# Patient Record
Sex: MALE | Race: WHITE | NOT HISPANIC OR LATINO
[De-identification: names, ages, dates, MRNs, and addresses within clinical notes are randomized per-mention and may not be internally consistent; named-entity substitution may affect disease eponyms.]

---

## 2018-09-21 ENCOUNTER — APPOINTMENT (OUTPATIENT)
Dept: ORTHOPEDIC SURGERY | Facility: CLINIC | Age: 68
End: 2018-09-21
Payer: MEDICARE

## 2018-09-21 VITALS
HEART RATE: 55 BPM | SYSTOLIC BLOOD PRESSURE: 121 MMHG | HEIGHT: 69 IN | DIASTOLIC BLOOD PRESSURE: 76 MMHG | WEIGHT: 198 LBS | BODY MASS INDEX: 29.33 KG/M2

## 2018-09-21 PROCEDURE — 99203 OFFICE O/P NEW LOW 30 MIN: CPT

## 2019-04-24 ENCOUNTER — APPOINTMENT (OUTPATIENT)
Dept: ORTHOPEDIC SURGERY | Facility: CLINIC | Age: 69
End: 2019-04-24
Payer: MEDICARE

## 2019-04-24 DIAGNOSIS — M75.121 COMPLETE ROTATOR CUFF TEAR OR RUPTURE OF RIGHT SHOULDER, NOT SPECIFIED AS TRAUMATIC: ICD-10-CM

## 2019-04-24 PROCEDURE — 99213 OFFICE O/P EST LOW 20 MIN: CPT | Mod: 25

## 2019-04-24 PROCEDURE — 20610 DRAIN/INJ JOINT/BURSA W/O US: CPT | Mod: RT

## 2019-04-24 NOTE — PHYSICAL EXAM
[UE] : Sensory: Intact in bilateral upper extremities [Rad] : radial 2+ and symmetric bilaterally [B.R.] : biceps 2+ and symmetric bilaterally [de-identified] : Pt is pleasant 67 y/o male AAOx3 with no apparent distress, normal BMI.  Physical examination of both shoulders reveals normal contours with no deformity with no atrophy, skin intact, with no signs of infection, no erythema, no swelling, no discoloration, no distal lymphedema, no patholaxity, no muscle atrophy noted. ROM of right shoulder reveals forward flexion to 70 degrees, external rotation 60 degrees, IR/ADD to L2. Strength is 4/5 with FF and 5/5 with ER testing. + Speeds test and negative Slope. No neurological deficits noted. \par \par

## 2019-04-24 NOTE — DISCUSSION/SUMMARY
[de-identified] : 67 y/o male with right shoulder pain secondary to rotator cuff tear. A lengthy discussion was held regarding the patient’s condition and treatment options including all risks, benefits, prognosis and outcomes of each were discussed in detail. The patient was advised on having surgery but due to his job he does not want to pursue surgery. He would like to postpone a few years and he was advised that his cuff repair success rate will decrease the longer he waits. The patient understand the risk and he would like to continue with conservative treatment and have a cortisone injection. He was given an injection which he tolerated well. He was advised on the use of NSAIDS for pain control, icing and activity modification. The patient will contact me if there are any concerns otherwise follow up will be on a prn basis. The patient express understanding and all questions were answered. \par

## 2019-04-24 NOTE — HISTORY OF PRESENT ILLNESS
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 He denies any new injury but states that he was operating on a patient and twinge his shoulder which made his pain worse for the past month. It has not improved since the injury. He was treated with cortisone injection by Dr. Bernardo Payne in the past which did help and would like to try another cortisone injection today. He states the pain is 8/10 at this visit. He does not want to have surgery and would like to continue with conservative treatment due to his job. The pain does not wake him up at night.

## 2019-04-24 NOTE — CONSULT LETTER
[Dear  ___] : Dear  [unfilled], [FreeTextEntry1] : I had the pleasure of evaluating your patient in the office today for complaints of right shoulder pain secondary rotator cuff tear. I have enclosed a copy of today's office notes for your charts and for your review.\par \par Sincerely, \par \par Valentin Christianson MS PA-C\par Department of Orthopedic Surgery\par [facility] [place]

## 2019-04-24 NOTE — PROCEDURE
[de-identified] : After careful discussion regarding the risks versus benefits of a corticosteroid injection the patient has elected to proceed with that treatment. Under sterile conditions, the patient received  a right shoulder injection into the subacromial space with 7 cc of half percent Marcaine without epinephrine but with 2 cc of Celestone. The patient tolerated the injection without problem.

## 2022-04-08 ENCOUNTER — APPOINTMENT (OUTPATIENT)
Dept: ORTHOPEDIC SURGERY | Facility: CLINIC | Age: 72
End: 2022-04-08
Payer: MEDICARE

## 2022-04-08 VITALS — WEIGHT: 198 LBS | HEIGHT: 69 IN | BODY MASS INDEX: 29.33 KG/M2

## 2022-04-08 PROCEDURE — 99213 OFFICE O/P EST LOW 20 MIN: CPT | Mod: 25

## 2022-04-08 PROCEDURE — J3490M: CUSTOM

## 2022-04-08 PROCEDURE — 20550 NJX 1 TENDON SHEATH/LIGAMENT: CPT

## 2022-04-08 NOTE — PHYSICAL EXAM
[de-identified] : Right Hand: Inspection of the hand/wrist is as follows: Swelling of thumb CMC joint. Palpation of the hand/wrist is as follows: Tenderness over 1st CMC joint. Range of motion of the hand/wrist is as follows: Stiffness of 1st finger(s) Special testing of the hand/wrist is as follows: positive thumb basal grind \par \par Left Hand: Inspection of the hand/wrist is as follows: mild hand swelling Palpation of the hand/wrist is as follows: Tenderness over 1st CMC joint. Range of motion of the hand/wrist is as follows: Stiffness of 1st finger(s) Special testing of the hand/wrist is as follows: positive thumb basal grind. RF stiffness; no locking, clicking

## 2022-04-08 NOTE — ASSESSMENT
[FreeTextEntry1] : Trigger finger injection was performed because of pain inflammation and stiffness\par Anesthesia: ethyl chloride sprayed topically\par Celestone: An injection of Celestone 1cc\par Lidocaine: An injection of Lidocaine 1% 1cc\par Marcaine: An injection of Marcaine 0.5% 1cc\par \par Patient has tried OTC's including aspirin, Ibuprofen, Aleve etc or prescription NSAIDS, and/or exercises at home and/ or\par physical therapy without satisfactory response.\par After verbal consent using sterile preparation and technique. The risks, benefits, and alternatives to cortisone injection\par were explained in full to the patient. Risks outlined include but are not limited to infection, sepsis, bleeding, scarring, skin\par discoloration, temporary increase in pain, syncopal episode, failure to resolve symptoms, allergic reaction, symptom\par recurrence, and elevation of blood sugar in diabetics. Patient understood the risks. All questions were answered. After\par discussion of options, patient requested an injection. Oral informed consent was obtained and sterile prep was done of the\par injection site. Sterile technique was utilized for the procedure including the preparation of the solutions used for the\par injection. Patient tolerated the procedure well. Advised to ice the injection site this evening.\par Prep with betadine locally to site. Sterile technique used

## 2022-09-02 ENCOUNTER — APPOINTMENT (OUTPATIENT)
Dept: ORTHOPEDIC SURGERY | Facility: CLINIC | Age: 72
End: 2022-09-02

## 2022-09-02 VITALS — HEIGHT: 69 IN | WEIGHT: 198 LBS | BODY MASS INDEX: 29.33 KG/M2

## 2022-09-02 DIAGNOSIS — M18.11 UNILATERAL PRIMARY OSTEOARTHRITIS OF FIRST CARPOMETACARPAL JOINT, RIGHT HAND: ICD-10-CM

## 2022-09-02 DIAGNOSIS — M65.342 TRIGGER FINGER, LEFT RING FINGER: ICD-10-CM

## 2022-09-02 DIAGNOSIS — M18.12 UNILATERAL PRIMARY OSTEOARTHRITIS OF FIRST CARPOMETACARPAL JOINT, LEFT HAND: ICD-10-CM

## 2022-09-02 PROCEDURE — 99213 OFFICE O/P EST LOW 20 MIN: CPT

## 2022-09-02 NOTE — PHYSICAL EXAM
[de-identified] : Right Hand: Inspection of the hand/wrist is as follows: Swelling of thumb CMC joint. Palpation of the hand/wrist is as follows: Tenderness over 1st CMC joint. Range of motion of the hand/wrist is as follows: Stiffness of 1st finger(s) Special testing of the hand/wrist is as follows: positive thumb basal grind \par SF, RF, MF are stiff, tender, swollen\par \par Left Hand: Inspection of the hand/wrist is as follows: mild hand swelling Palpation of the hand/wrist is as follows: Tenderness over 1st CMC joint. Range of motion of the hand/wrist is as follows: Stiffness of 1st finger(s) Special testing of the hand/wrist is as follows: positive thumb basal grind. \par RF stiffness; no locking, clicking; tender PIP

## 2022-09-02 NOTE — ASSESSMENT
[FreeTextEntry1] : He can no longer work\par He has reached MMI\par No surgery\par He can return to see me as needed

## 2022-09-02 NOTE — HISTORY OF PRESENT ILLNESS
[6] : 6 [5] : 5 [Dull/Aching] : dull/aching [de-identified] : Bilateral hands, fingers, thumb pain \par Injections help temporarily\par  [FreeTextEntry1] : L hand and R hand [de-identified] : ice